# Patient Record
Sex: MALE | Race: WHITE
[De-identification: names, ages, dates, MRNs, and addresses within clinical notes are randomized per-mention and may not be internally consistent; named-entity substitution may affect disease eponyms.]

---

## 2023-01-11 ENCOUNTER — HOSPITAL ENCOUNTER (OUTPATIENT)
Dept: HOSPITAL 76 - SC | Age: 33
Discharge: HOME | End: 2023-01-11
Attending: NURSE PRACTITIONER
Payer: COMMERCIAL

## 2023-01-11 VITALS — DIASTOLIC BLOOD PRESSURE: 72 MMHG | SYSTOLIC BLOOD PRESSURE: 114 MMHG

## 2023-01-11 DIAGNOSIS — F98.8: ICD-10-CM

## 2023-01-11 DIAGNOSIS — R06.83: Primary | ICD-10-CM

## 2023-01-11 DIAGNOSIS — E66.9: ICD-10-CM

## 2023-01-11 DIAGNOSIS — R53.83: ICD-10-CM

## 2023-01-11 DIAGNOSIS — R06.81: ICD-10-CM

## 2023-01-11 DIAGNOSIS — G47.10: ICD-10-CM

## 2023-01-11 DIAGNOSIS — F17.290: ICD-10-CM

## 2023-01-11 DIAGNOSIS — G47.8: ICD-10-CM

## 2023-01-11 PROCEDURE — 99212 OFFICE O/P EST SF 10 MIN: CPT

## 2023-01-11 PROCEDURE — 99203 OFFICE O/P NEW LOW 30 MIN: CPT

## 2023-01-11 NOTE — SLEEP CARE CONSULTATION
Information from patient questionnaire entered by Lane Dasilva.





I have reviewed and concur with the information entered by Lane Dasilva. This 

document represents the service I personally performed and the decisions made by

me, Glenny Boykin ARNP.





History of Present Illness


Service Date and Time: 2023    1015


Reason for Visit: New patient


Chief Complaint: reports: Unrefreshed sleep, Snoring, Observed pauses in 

breathing


Date of Onset: 10+YRS


Usual bedtime: 12-130AM


Time it takes to fall asleep: 5MIN


Snores at night: Yes


Observed to quit breathing while asleep: Yes


Sleeps alone due to snoring: Yes


Number of times waking at night: 0


Reasons for waking at night: reports: Other (wife will wake him up for snoring 

or gasping for air in his sleep)


Toss, Turn, or Twitch while sleeping: Yes


Recalls having dreams: No


Usually gets out of bed at: 0645; weekends 7-9 AM


Feels refreshed in the morning: No


Morning headache: No


Sleepy or fatigued during the day: Yes


Ever fallen asleep while driving: No


Takes day naps: No


Dreams during day naps: No


Prior sleep studies: No


Additional HPI information: 





I had the pleasure of seeing ANDREW WEAVER today regarding the possibility of 

him having a sleep disorder. His current complaints are unrefreshed sleep, 

snoring and observed pauses in breathing. He states he snores a lot. He states 

1-2 years ago he sat up in bed and was gasping for air. His wife woke up and 

asked him if he was ok, he told her yes and layed back down and went to sleep. 

He did not remember this happening. He says his wife will send him to sleep on 

the couch on his stomach because of the loud snoring and gasping in his sleep. 

He wakes up feeling tired and groggy. He states he will wake up "dazed" if he 

sleeps on his back that lasts for a few hours in the morning. He states that if 

he sleeps 8 hours he feels worse than if he just sleeps 6.5-7 hours. 





- Parasomnia Symptoms


Ever been unable to move upon waking from sleep: No


Walks in sleep: No


Talks in sleep: No


Ever acted out dreams in sleep: No


Ever felt weak in the knees when startled or emotional: No


Bothered by creepy, crawly, restless sensations in legs: No


Problems with memory or concentration: Yes (both, varies on the day)





Subjective


Initial [place] Sleepiness Scale score: 10 (23)





Past Medical History


Past Medical History: reports: Attention deficit, Other (appendectomy, pilonadil

cysts)





Social History


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 Patient is  and lives in [place]. 





Have you smoked in the past 12 months: No (still vaping daily)


Years of smokin


Quit date: 


Alcohol use: Yes


Alcohol amount and frequency: 1-2 DRINKS ONCE EVERY MONTH OR TWO


Caffeine use: Yes


Caffeine amount and frequency: 140-400MG DAILY





Family History


Family history of sleep disordered breathing: Yes (son using CPAP)


Family Hx Sleep Apnea: Father: Snoring





Allergies and Home Medications


Known drug allergies: Yes (Amoxicillin)


Drug allergies reviewed: Yes


Home medication list reviewed: Yes


Allergy and home medication list: 





Medications:


Methylphenidate


Tylenol, prn


Ibuprofen, prn





Review of Systems


Weight gain over past 5 years: 20


Cardiovascular: denies: high blood pressure


Respiratory: denies: shortness of breath


Gastrointestinal: denies: heartburn


Neurological: denies: headaches, head trauma


Psychiatric: reports: Attention Deficit Hyperactivity.  denies: anxiety, 

depression


Ear/Nose/Throat: reports: wisdom teeth removed.  denies: injury to nose, 

tonsillectomy


Endocrine: denies: thyroid disease


Immunologic: denies: allergies to food or environment





Physical Exam


Vital signs obtained and entered by: LANE MARES MA


Blood Pressure: 114/72 (LEFT ARM)


Cuff size: regular


Heart Rate: 78


O2 Saturation: 98


Height: 6 ft


Weight: 251 lb 9.6 oz


Body Mass Index: 34.1


BMI Classification: Obese


Neck circumference: 16.5


Mouth and throat: narrow oropharynx


Soft palate: long


Hard palate: normal


Uvula: normal


Uvula visualization: 25% Mallampati Class III


Tongue: enlarged in size with teeth marks on lateral edges


Tonsils: small


Neck: normal w/o lymphadenopathy or thyromegaly


Heart: regular rate and rhythm


Lungs: clear bilaterally





Impression and Plan





1. Suspected Obstructive Sleep Apnea-Hypopnea Syndrome, as suggested by a 

history of loud and irregular snoring, observed cessation of breath while 

asleep, gasping or choking in sleep, unrefreshed sleep, cognitive impairment, 

and excessive daytime sleepiness. Narrow oropharynx and obesity are common 

predisposing factors for obstructive sleep apnea-hypopnea syndrome. I recommend 

proceeding to polysomnography to confirm the diagnosis and to assess severity. 

If the patient has significant sleep disordered breathing, a manual CPAP 

titration study will also be performed to find the optimal treatment pressure. I

informed the patient of what the sleep studies involve and after some 

discussion, obtained agreement to proceed. The pathophysiology of obstructive 

sleep apnea-hypopnea syndrome was discussed with the patient and health risks of

cardiovascular and cerebrovascular disease if not treated.  Risks of drowsy 

driving discussed in detail and patient advised to avoid long distance driving 

and to pull over at the first sign of drowsiness. Patient agreed to plan. 





* Schedule polysomnography 


* Avoid long distance driving or driving when feeling sleepy.


* Avoid alcohol, sedative and muscle relaxant around bedtime.


* Attempt to lose weight.


* Review instructions provided by trained office staff on how to prepare for the

  sleep study.


* Return for follow-up after sleep study completed.








Counseling Topics: Weight loss health impact


Visit Type: In Office


Time Spent with Patient (minutes): 30


Provider Statement: I spent 100% of the Face to Face Visit with the patient with

greater than 50% spent counseling the patient and coordination of care.

## 2023-04-06 ENCOUNTER — HOSPITAL ENCOUNTER (OUTPATIENT)
Dept: HOSPITAL 76 - SC | Age: 33
Discharge: HOME | End: 2023-04-06
Attending: NURSE PRACTITIONER
Payer: COMMERCIAL

## 2023-04-06 VITALS — DIASTOLIC BLOOD PRESSURE: 74 MMHG | SYSTOLIC BLOOD PRESSURE: 126 MMHG

## 2023-04-06 DIAGNOSIS — R09.02: ICD-10-CM

## 2023-04-06 DIAGNOSIS — G47.33: Primary | ICD-10-CM

## 2023-04-06 DIAGNOSIS — E66.3: ICD-10-CM

## 2023-04-06 PROCEDURE — 99212 OFFICE O/P EST SF 10 MIN: CPT

## 2023-04-06 NOTE — SLEEP CARE CONSULTATION
Information from patient questionnaire entered by Janette Dasilva.





I have reviewed and concur with the information entered by Janette Dasilva. This 

document represents the service I personally performed and the decisions made by

me, Glenny Boykin ARNP.





History of Present Illness


Service Date and Time: 04/06/2023    1251


Initial [place] Sleepiness Scale score: 10 (01/11/23)


Current [place] Sleepiness Scale score: 13 (04/06/23)


Additional HPI information: 





ANDREW WEAVER returns for follow up and results of the recently performed 

polysomnography.





I explained the pathophysiology behind obstructive sleep apnea. We then spent 

quite a bit of time discussing different treatment options.  For mild 

obstructive sleep apnea, surgery and oral appliance are alternatives to nasal 

CPAP therapy but in moderate or severe cases, nasal CPAP is the most effective 

and reliable treatment.   





Because apnea is primarily in supine position, then positional management 

therapy could be effective. Methods discussed such as positioning with pillows 

to prevent supine sleep. I reviewed the impact of weight changes on sleep apnea 

and strongly recommended losing weight.  After some discussion, the patient 

opted to go with the nasal CPAP therapy.  Nasal autoCPAP set at 4-15 cmH20 will 

be ordered with rationale explained. A manual titration study will be ordered if

unable to find optimal pressure with office adjustments. 





I explained how CPAP machine works and what to expect when using the machine.  

Using CPAP every night in order to get used to it was emphasized.  Patient 

advised to put CPAP mask on before getting into bed so as not to fall asleep 

without CPAP.  To assist acclimation to CPAP use, it could also be used for a 

short time during day while reading or watching TV. The patient was instructed 

to call the CPAP supplier to discuss any mechanical problem that may occur. If 

the mask given is uncomfortable or is difficult to keep on through the night 

even with adjustment, contact the CPAP supplier as many will replace with 

another mask style if notified before  30 days.  If snoring or perceives is not 

getting enough air or too much air from the machine, notify this office. Patient

counseled not drink alcohol less than 4 hours before bedtime as it can increase 

snoring and apnea.  Patient was cautioned about risks of drowsy driving until 

sleepiness symptoms resolve. Patient denies drowsy driving. 





Sleep Study





- Results


Type of Sleep Study: Polysomnography (COMPLETED 02/17/23)


Prior sleep studies: No


Polysomnography/Home Sleep Study results: 





IMPRESSION: The quality of the study is good. The patient had good sleep 

efficiency. The sleep architecture was


abnormal for sleep fragmentation and reduced amount of time spent in REM sleep. 

Respiratory monitoring showed


mild obstructive sleep apnea-hypopnea (AHI = 9.8) associated with frequent 

arousals, oxyhemoglobin desaturation


and mild hypoxia (kathleen oxygen saturation of 87%). The respiratory events 

occurred almost exclusively during


supine sleep (supine AHI = 12.9; non-supine = 0.58). Snore was sporadic and 

moderate in intensity. There was no


significant periodic leg movement of sleep. Cardiac rhythm was normal sinus 

rhythm without significant


arrhythmia. No abnormal behavior (parasomnia) observed during the night.





Allergies and Home Medications


Known drug allergies: Yes (amoxicillin)


Drug allergies reviewed: Yes


Home medication list reviewed: Yes (no changes)


Allergy and home medication list: 


Allergies





amoxicillin Allergy (Verified 04/05/23 09:38)





Review of Systems


Review of systems same as previous: Yes (no changes)





Physical Exam


Vital signs obtained and entered by: JANETTE MARES MA


Blood Pressure: 126/74 (LEFT ARM)


Cuff size: regular


Heart Rate: 76


O2 Saturation: 98


Height: 6 ft


Weight: 204 lb 6.4 oz


Body Mass Index: 27.7


BMI Classification: Overweight





Impression and Plan





1. Obstructive Sleep Apnea-Hypopnea Syndrome, mild, with lowest oxygen 

saturation of 87%. Obviously this is the cause of the patients symptoms of 

unrefreshed sleep, and excessive daytime sleepiness. Positive pressure therapy 

could benefit attention deficit. As mentioned above, the patient will be started

on nasal autoCPAP therapy with pressure set at 4-15 cmH2O. Compliance guidelines

also reviewed. A copy of compliance guidelines will be given for reference at 

check out. Because the apnea is more severe supine, I instructed to avoid 

sleeping supine using pillow positioning until able to start CPAP use. 





2. Hypoxemia, mild, with a kathleen oxygen saturation of 87% and 0.2 minutes spent 

under 90%. His baseline oxygen saturation was normal with an average oxygen 

saturation of 94%.





* Nasal auto CPAP therapy, pressure at 4-15 cm H2O.


* Attempt to lose weight.


* Avoid alcohol consumption near bedtime.


* Avoid supine sleep until using CPAP.


* The patient is again cautioned about driving until sleepiness completely 

  resolves.


* Return one month after CPAP obtained. I will assess response to therapy and 

  compliance at that time.





Counseling Topics: Weight loss health impact


Visit Type: In Office


Time Spent with Patient (minutes): 14


Provider Statement: I spent 100% of the Face to Face Visit with the patient with

greater than 50% spent counseling the patient and coordination of care.